# Patient Record
(demographics unavailable — no encounter records)

---

## 2025-01-31 NOTE — HISTORY OF PRESENT ILLNESS
[de-identified] : 1/31/25: Patient is here for right shoulder pain that began on 1/26/25, not due to injury. Lifts daily. Went to PCP, got XR - calcific tendinitis. Notes increased pain with lifting/extending. Denies n/t. No prior injury. Tried Ibuprofen.

## 2025-01-31 NOTE — IMAGING
[de-identified] : Right Shoulder Exam:  Inspection: Mild swelling, no ecchymosis, no nanda deformity Palpation: Tenderness to palpation  globally over shoulder Stability: No instability or tenderness over AC joint Range of motion: ROM guarded by pain; there is pain with strength testing Special testing: Positive Sanders test, positive impingement sign; speeds and yergason negative Motor and sensory intact distally [Right] : right shoulder [There are no fractures, subluxations or dislocations. No significant abnormalities are seen] : There are no fractures, subluxations or dislocations. No significant abnormalities are seen [Calcific density] : Calcific density

## 2025-01-31 NOTE — ASSESSMENT
[FreeTextEntry1] : Atraumatic right shoulder pain for the last 5 days with a large cluster of calcific tendinitis noted on x-rays.  She is very symptomatic from this.  We discussed a comprehensive treatment plan.  She is very anxious about nature of her condition and prognosis.  Hesitant about a corticosteroid injection today so we will start her off with a Medrol Dosepak prescription.  If no improvement in a week could consider subacromial injection eventually osman and I explained certain cases do require surgical resection.  The patient's current medication management of their orthopedic diagnosis was reviewed today: There is a moderate risk of morbidity with further treatment, especially from use of prescription strength medications and possible side effects of these medications which include upset stomach with oral medications, skin reactions to topical medications and cardiac/renal/diabetes issues with long term use.